# Patient Record
Sex: FEMALE | Race: OTHER
[De-identification: names, ages, dates, MRNs, and addresses within clinical notes are randomized per-mention and may not be internally consistent; named-entity substitution may affect disease eponyms.]

---

## 2018-02-27 ENCOUNTER — HOSPITAL ENCOUNTER (OUTPATIENT)
Dept: HOSPITAL 80 - FIMAGING | Age: 78
End: 2018-02-27
Attending: FAMILY MEDICINE
Payer: MEDICAID

## 2018-02-27 DIAGNOSIS — M16.12: Primary | ICD-10-CM

## 2018-06-07 ENCOUNTER — HOSPITAL ENCOUNTER (EMERGENCY)
Dept: HOSPITAL 80 - FED | Age: 78
Discharge: HOME | End: 2018-06-07
Payer: MEDICAID

## 2018-06-07 VITALS — SYSTOLIC BLOOD PRESSURE: 138 MMHG | DIASTOLIC BLOOD PRESSURE: 60 MMHG

## 2018-06-07 DIAGNOSIS — M54.5: ICD-10-CM

## 2018-06-07 DIAGNOSIS — Z87.891: ICD-10-CM

## 2018-06-07 DIAGNOSIS — G89.29: ICD-10-CM

## 2018-06-07 DIAGNOSIS — Z79.82: ICD-10-CM

## 2018-06-07 DIAGNOSIS — M53.3: Primary | ICD-10-CM

## 2018-06-07 DIAGNOSIS — E11.9: ICD-10-CM

## 2018-06-07 NOTE — EDPHY
H & P


Stated Complaint: Pain in left leg and hip x 4 days


Time Seen by Provider: 06/07/18 21:08


HPI/ROS: 


HPI:  This is a 78-year-old female who presents with





Chief Complaint: Pain in left leg and hip x 4 days





Location:  Left leg and hip


Quality:  Pain


Duration:  4 days


Signs and Symptoms:  No bleeding,+ radiation, no numbness, no weakness, no 

tingling, no incontinence, + decreased range of motion, no swelling, + pain, no 

fever


Timing:  Acute on chronic


Severity:  8/10


Context:  Patient presents with both of her daughters ambulatory without any 

deficits with complaints of gradual onset of left buttock and hip pain that is 

radiating down the posterior portion of her left leg for the last 4 days.  She 

denies any incontinence, weakness, numbness.  She is managed by pain management 

provider Ronald Crowder who prescribes fentanyl patch, gabapentin, oxycodone, 

diclofenac.  Last oxycodone dose was approximately 3 hr prior to arrival.  

Patient admits that she has anxiety and did take a benzodiazepine pill without 

any relief. 


Modifying Factors:  See above





Comment: 








ROS: see HPI


Constitutional: No fever, no chills, no weight loss


Eyes:  No blurred vision


Respiratory:  No shortness of breath, no cough


Cardiovascular:  No chest pain


Gastrointestinal:  No nausea, no vomiting no diarrhea 


Genitourinary:  No dysuria 


Extremities:  No myalgias 


Neurologic:  No weakness, no numbness


Skin:  No rashes


Hematologic:  No bruising, no bleeding





MEDICAL/SURGICAL/SOCIAL HISTORY: 


Medical history: BACK SURGERY, DIABETIC, ESSENTIAL TREMOR, back infection/I&D


Surgical history:  Denies


Social history:  Lives with family.  Retired. 








CONSTITUTIONAL:  Polite and cooperative nontoxic-appearing  female, 

awake and alert, no obvious distress


HEENT: Atraumatic and normocephalic.


NECK: supple, no midline tenderness


Cardiovascular: Normal S1/S2, regular rate, regular rhythm, without murmur rub 

or gallop.


PULMONARY/CHEST:  Symmetrical and nontender. no crepitus. Clear to auscultation 

bilaterally. Good air movement. No accessory muscle usage.


ABDOMEN:  Soft, nondistended, nontender, no ecchymosis.


PELVIC: no pain with rocking; bilateral hips flexion 125 degrees, extension 30 

degrees, with no pain internal rotation and no pain external rotation.


BACK:  No midline tenderness, no paraspinous spasm, reproducible left SI joint 

tenderness; deep tendon reflexes 2/2, no pain with straight leg raise, No foot 

drop.  Achilles reflexes are equal bilaterally.  Able to walk on heels and toes 

without difficulty.


EXTREMITIES:  2/2 pulses, strength 5/5, DIP/PIP/MCP flexion/extension intact 

with good light touch sensation. no deformities, no clubbing, no cyanosis or 

edema.


NEUROLOGICAL: no focal neuro deficits.  GCS 15.  Light touch sensation intact.


SKIN: Warm and dry, no erythema. no rash.  Good capillary refill.   





Source: Patient,  (Pashto)


Exam Limitations: Language barrier





- Personal History


Current Tetanus/Diphtheria Vaccine: Unsure


Current Tetanus Diphtheria and Acellular Pertussis (TDAP): Unsure





- Medical/Surgical History


Hx Asthma: No


Hx Chronic Respiratory Disease: No


Hx Diabetes: Yes


Hx Cardiac Disease: No


Hx Renal Disease: No


Hx Cirrhosis: No


Hx Alcoholism: No


Hx HIV/AIDS: No


Hx Splenectomy or Spleen Trauma: No


Other PMH: BACK SURGERY, DIABETIC, ESSENTIAL TREMOR, back infection/I&D,





- Social History


Smoking Status: Former smoker


Constitutional: 


 Initial Vital Signs











Temperature (C)  37.2 C   06/07/18 20:38


 


Heart Rate  105 H  06/07/18 20:38


 


Respiratory Rate  18   06/07/18 20:38


 


Blood Pressure  188/163 H  06/07/18 20:38


 


O2 Sat (%)  98   06/07/18 20:38








 











O2 Delivery Mode               Room Air














Allergies/Adverse Reactions: 


 





morphine Allergy (Mild, Verified 07/18/16 18:38)


 Itching








Home Medications: 














 Medication  Instructions  Recorded


 


Lisinopril [Zestril 40 mg (RX)] 40 mg PO DAILY 03/03/13


 


Multivit-Min/FA/Lycopene/Lut 1 each PO DAILY 03/03/13





[Centrum Silver Tablet]  


 


Simvastatin [Zocor 20 mg (RX)] 20 mg PO HS 03/03/13


 


Fentanyl Patch  10/28/15


 


Oxycodone    10/28/15


 


clonAZEPAM  03/20/16


 


Aspirin  06/07/18


 


CLONAZEPAM  06/07/18


 


Diclofenac Sodium  06/07/18


 


Gabapentin  06/07/18


 


Ranitidine HCl  06/07/18














Medical Decision Making





- Diagnostics


Imaging Results: 


 Imaging Impressions





Hip X-Ray  06/07/18 21:19


Impression: There is no acute osseous abnormality, or significant interval 

change since 2/27/2018.








Lumbar Spine X-Ray  06/07/18 21:19


Impression: Stable exam, compared to 10/28/2015.











ED Course/Re-evaluation: 


Patient is already on a pain management contract. 


No neurological deficits to warrant emergent MRI. 


Suspect patient has SI joint dysfunction and pain. 


Lumbar sacral x-ray and left hip x-ray ordered. 


Given IV Valium 2.5 mg. Patient is not a good NSAID or steroid candidate. 


Lumbar x-rays my read show hardware present; Stable exam, compared to 10/28/

2015.


Hip x-ray my read shows no fracture, dislocation. 


2215:  Reassessed patient who reports mild relief of symptoms.  IV Dilaudid 1 

mg and IV Valium 2.5 mg given


2330:  Reassessed patient who reports that she feels better and wants to be 

discharged home.  Offered patient admission for pain control and further 

imaging of her lumbar spine and she has politely declined.  Daughters at 

bedside also agree that patient has chronic pain and has a primary care/pain 

management doctor.  I did give her a prepack for oxycodone.  She is to call her 

primary care provider/pain management doctor tomorrow. 


Patient is ambulatory without any deficits at discharge. 














This patient was seen under the supervision of my secondary supervising 

physician.  I evaluated care for this patient independently.  Discussed this 

patient with Dr. McCollester who did not see the patient.  





Differential Diagnosis: 


Back pain including but not limited to muscular pain, herniated disc, spine 

fracture, intra-abdominal causes and urinary tract infection.








- Data Points


Medications Given: 


 








Discontinued Medications





Diazepam (Valium)  2.5 mg IVP EDNOW ONE


   Stop: 06/07/18 21:20


   Last Admin: 06/07/18 21:34 Dose:  2.5 mg


Diazepam (Valium)  2.5 mg IVP EDNOW ONE


   Stop: 06/07/18 22:14


   Last Admin: 06/07/18 22:20 Dose:  2.5 mg


Hydromorphone HCl (Dilaudid)  1 mg IVP EDNOW ONE


   Stop: 06/07/18 22:14


   Last Admin: 06/07/18 22:20 Dose:  1 mg








Departure





- Departure


Disposition: Home, Routine, Self-Care


Clinical Impression: 


 Sacroiliac joint dysfunction of left side, Acute exacerbation of chronic low 

back pain





Condition: Good


Instructions:  Sacroiliac Joint Injection (DC), Arthralgia (ED), Back Pain (ED)


Additional Instructions: 


Please take all of your regular medications as directed. 


Call your primary care provider/pain management doctor tomorrow for follow-up. 


Rest as much as possible until you are feeling better. 


If symptoms continue to persist, it may be beneficial to receive a sacroiliac 

joint injection. 








Return to the ER immediately if you have new or worsening back pain, fevers/

chills, flu like symptoms, incontinence or inability to urinate or defecate, 

weakness, paralysis, or any other symptom that concerns you


Referrals: 


Edilma Crowder MD [Primary Care Provider] - 1 day without fail

## 2018-07-05 ENCOUNTER — HOSPITAL ENCOUNTER (OUTPATIENT)
Dept: HOSPITAL 80 - FIMAGING | Age: 78
End: 2018-07-05
Attending: NURSE PRACTITIONER
Payer: MEDICAID

## 2018-07-05 DIAGNOSIS — M54.17: ICD-10-CM

## 2018-07-05 DIAGNOSIS — G89.4: Primary | ICD-10-CM

## 2018-07-05 DIAGNOSIS — M48.061: ICD-10-CM

## 2018-07-05 DIAGNOSIS — M96.1: ICD-10-CM

## 2019-02-17 ENCOUNTER — HOSPITAL ENCOUNTER (EMERGENCY)
Dept: HOSPITAL 80 - FED | Age: 79
Discharge: HOME | End: 2019-02-17
Payer: MEDICAID

## 2019-02-17 VITALS — DIASTOLIC BLOOD PRESSURE: 85 MMHG | SYSTOLIC BLOOD PRESSURE: 160 MMHG

## 2019-02-17 DIAGNOSIS — R25.1: ICD-10-CM

## 2019-02-17 DIAGNOSIS — Z98.890: ICD-10-CM

## 2019-02-17 DIAGNOSIS — M54.5: Primary | ICD-10-CM

## 2019-02-17 DIAGNOSIS — G89.29: ICD-10-CM

## 2019-02-17 NOTE — EDPHY
H & P


Time Seen by Provider: 02/17/19 07:20


Constitutional: 


 Initial Vital Signs











Temperature (C)  36.9 C   02/17/19 07:24


 


Heart Rate  61   02/17/19 07:24


 


Respiratory Rate  18   02/17/19 07:24


 


Blood Pressure  166/74 H  02/17/19 07:24


 


O2 Sat (%)  96   02/17/19 07:24








 











O2 Delivery Mode               Room Air














Allergies/Adverse Reactions: 


 





morphine Allergy (Mild, Verified 07/18/16 18:38)


 Itching








Home Medications: 














 Medication  Instructions  Recorded


 


Lisinopril [Zestril 40 mg (RX)] 40 mg PO DAILY 03/03/13


 


Multivit-Min/FA/Lycopene/Lut 1 each PO DAILY 03/03/13





[Centrum Silver Tablet]  


 


Simvastatin [Zocor 20 mg (RX)] 20 mg PO HS 03/03/13


 


Fentanyl Patch  10/28/15


 


Oxycodone    10/28/15


 


clonAZEPAM  03/20/16


 


Aspirin  06/07/18


 


CLONAZEPAM  06/07/18


 


Diclofenac Sodium  06/07/18


 


Gabapentin  06/07/18


 


Ranitidine HCl  06/07/18














Medical Decision Making


ED Course/Re-evaluation: 





CHIEF COMPLAINT:  Back pain





HISTORY OF PRESENT ILLNESS:  


The patient is a 79 y/o female with a history of spinal surgery, chronic back 

pain, and diabetes arriving via EMS complaining of an acute exacerbation of 

back pain. Per EMS the patient has a history of chronic back pain. She is 

managed by pain management provider Ronald Crowder who prescribes fentanyl patch

, gabapentin, oxycodone, clonazepam, diclofenac. She has a fentanyl patch on 

her buttock and took Aspirin and Lisinopril this morning. She denies taking 

oxycodone this morning as "it wasn't due till 9:00am". While en route to the 

hospital she was given 100mcg IV Fentanyl which provided mild relief. She is 

still having back pain today. This patient is difficult to obtain a concise 

history from. No fever, headache, chest pain, shortness of breath, abdominal 

pain, urinary or bowel complaints, numbness, paresthesias.





A  was use at bedside to communicate with the patient.





REVIEW OF SYSTEMS:  





A comprehensive 10 system review of systems is otherwise negative aside from 

elements mentioned in the history of present illness and medical decision 

making.





PHYSICAL EXAM:  





HR, BP, O2 Sat, RR.  Temp noted


General Appearance:  Alert, well hydrated, appropriate, and non-toxic appearing.


Head:  Atraumatic without scalp tenderness or obvious injury


Eyes:  Pupils equal, round, reactive to light and accommodation, EOMI, no trauma

, no injection.


Ears:  Clear bilaterally, no perforation, normal landmarks


Nose:  Atraumatic, no rhinorrhea, clear.


Throat:  There is no erythema or exudates, no lesions, normal tonsils, mucus 

membranes moist.


Neck:  Supple, 2+ carotid upstroke, nontender, no lymphadenopathy.


Respiratory:  No retractions, no distress, no wheezes, and no accessory muscle 

use.  Lungs are clear to auscultation bilaterally.


Cardiovascular:  Regular rate and rhythm, no murmurs, rubs, or gallops. 

Bilateral carotid, radial, dorsalis pedis, and posterior tibial pulses intact. 

Good capillary refill all extremities.


Gastrointestinal:  Abdomen is soft, nontender, non-distended, no masses, no 

rebound, no guarding, no peritoneal signs.


Musculoskeletal:  Normal active ROM of all extremities, atraumatic.


Neurological:  Alert, appropriate, and interactive. Essential tremor. The 

patient has normal DTRs and non-focal cranial nerves, motor, sensory, and 

cerebellar exam.


Skin:  No rashes, good turgor, no nodules on palpation.





Past medical history: Diabetes, essential tremor, chronic back pain


Past surgical history: Spinal surgery


Family history: Denies


Social history: Daughters at bedside, lives in [place], retired





DIAGNOSTICS/PROCEDURES/CRITICAL CARE TIME:  


Not indicated





DIFFERENTIAL DIAGNOSIS:   


The differential diagnosis for the patient's back pain included but was not 

limited to musculoskeletal pain, epidural abscess, herniated disk, spinal 

fracture, and intra-abdominal causes including urinary system.





MEDICAL DECISION MAKING:  


The patient is a 79 y/o female with a history of spinal surgery, chronic back 

pain, and diabetes arriving via EMS presenting with an acute exacerbation of 

back pain. She does take Oxycodone every 4 hours and uses a 12.5mcg fentanyl 

patch. On exam she has an essential tremor but an otherwise normal exam. Patient

's Fentanyl patch is not holding over her back pain during the night and she 

does not want to take the Oxycodone early. I will remove the 12.5mcg Fentanyl 

patch and replace it with a 25mcg IV Fentanyl patch. I have advised the patient 

and her daughters to follow up with the patient's pain management specialist on 

Monday to further discuss her pain medications. Return precautions provided; 

patient is comfortable with this plan.








Departure





- Departure


Disposition: Home, Routine, Self-Care


Clinical Impression: 


Back pain


Qualifiers:


 Back pain location: low back pain Chronicity: chronic Back pain laterality: 

unspecified Sciatica presence: without sciatica Qualified Code(s): M54.5 - Low 

back pain





Condition: Good


Instructions:  Chronic Back Pain (DC), Back Pain (ED)


Additional Instructions: 


1. Followup with your pain management specialist on Monday to discuss the 

higher dose of the Fentanyl patch.


2. Return to the emergency department for severe pain, fever, numbness, 

difficulty walking, change in location or nature of pain or other concerns.  





Referrals: 


Edilma Crowder MD [Medical Doctor] - As per Instructions


Report Scribed for: Aaron Rome


Report Scribed by: Yelena Kim


Date of Report: 02/17/19


Time of Report: 07:20

## 2019-06-27 ENCOUNTER — HOSPITAL ENCOUNTER (INPATIENT)
Dept: HOSPITAL 80 - FED | Age: 79
LOS: 2 days | Discharge: NURSING, REHAB OR LONG-TERM CARE | End: 2019-06-29
Payer: COMMERCIAL